# Patient Record
Sex: FEMALE | Race: WHITE | URBAN - METROPOLITAN AREA
[De-identification: names, ages, dates, MRNs, and addresses within clinical notes are randomized per-mention and may not be internally consistent; named-entity substitution may affect disease eponyms.]

---

## 2017-05-16 ENCOUNTER — OUTPATIENT (OUTPATIENT)
Dept: OUTPATIENT SERVICES | Facility: HOSPITAL | Age: 47
LOS: 1 days | Discharge: HOME | End: 2017-05-16

## 2017-06-28 DIAGNOSIS — Z00.00 ENCOUNTER FOR GENERAL ADULT MEDICAL EXAMINATION WITHOUT ABNORMAL FINDINGS: ICD-10-CM

## 2017-06-28 DIAGNOSIS — N39.0 URINARY TRACT INFECTION, SITE NOT SPECIFIED: ICD-10-CM

## 2018-08-27 ENCOUNTER — OUTPATIENT (OUTPATIENT)
Dept: OUTPATIENT SERVICES | Facility: HOSPITAL | Age: 48
LOS: 1 days | Discharge: HOME | End: 2018-08-27

## 2018-08-27 DIAGNOSIS — F33.1 MAJOR DEPRESSIVE DISORDER, RECURRENT, MODERATE: ICD-10-CM

## 2018-08-27 DIAGNOSIS — F60.3 BORDERLINE PERSONALITY DISORDER: ICD-10-CM

## 2018-08-27 DIAGNOSIS — F41.1 GENERALIZED ANXIETY DISORDER: ICD-10-CM

## 2018-10-08 PROBLEM — Z00.00 ENCOUNTER FOR PREVENTIVE HEALTH EXAMINATION: Status: ACTIVE | Noted: 2018-10-08

## 2018-10-29 ENCOUNTER — APPOINTMENT (OUTPATIENT)
Dept: OTOLARYNGOLOGY | Facility: CLINIC | Age: 48
End: 2018-10-29
Payer: MEDICARE

## 2018-10-29 DIAGNOSIS — R42 DIZZINESS AND GIDDINESS: ICD-10-CM

## 2018-10-29 PROCEDURE — 92557 COMPREHENSIVE HEARING TEST: CPT

## 2018-10-29 PROCEDURE — 92570 ACOUSTIC IMMITANCE TESTING: CPT

## 2018-10-29 PROCEDURE — 99203 OFFICE O/P NEW LOW 30 MIN: CPT | Mod: 25

## 2019-10-24 ENCOUNTER — TRANSCRIPTION ENCOUNTER (OUTPATIENT)
Age: 49
End: 2019-10-24

## 2022-11-16 ENCOUNTER — APPOINTMENT (OUTPATIENT)
Dept: ORTHOPEDIC SURGERY | Facility: CLINIC | Age: 52
End: 2022-11-16

## 2022-11-16 PROCEDURE — 73130 X-RAY EXAM OF HAND: CPT | Mod: RT

## 2022-11-16 PROCEDURE — 99204 OFFICE O/P NEW MOD 45 MIN: CPT

## 2022-11-16 PROCEDURE — 99214 OFFICE O/P EST MOD 30 MIN: CPT
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
## 2022-12-14 ENCOUNTER — APPOINTMENT (OUTPATIENT)
Dept: ORTHOPEDIC SURGERY | Facility: CLINIC | Age: 52
End: 2022-12-14

## 2022-12-14 PROCEDURE — 99213 OFFICE O/P EST LOW 20 MIN: CPT

## 2022-12-14 NOTE — ASSESSMENT
[FreeTextEntry1] : Patient comes in for follow-up of her right thumb as well as small finger.  She is in therapy.  She says she is doing a lot better.  She still has some swelling.  She has been using the thumb spica brace.\par \par   Right hand : PIP joint swelling small finger, nontender palpation, decreased extension of the finger 10° to 100°,   Able to touch tip of finger to palm slightly decreased range of motion at DIP joint in flexion, PIP joint in extension, nontender palpation over UCL of thumb MP joint, no laxity with stressing in both extension and 30° of flexion, full range of motion of thumb, neurovascularly intact\par \par  the patient is doing a lot better than prior.  She is going to wean herself off of thumb spica brace and will use a thumb spica brace when she is doing anything that is strenuous.  Otherwise she will not use a thumb spica brace.  She will continue with therapy for range of motion of the small finger.  I will see her in 6 weeks.  I did discuss with her the swelling may never 100% improve.  I also discussed with her she may not get full range of motion back of the small finger with a mild flexion contracture.

## 2023-01-25 ENCOUNTER — APPOINTMENT (OUTPATIENT)
Dept: ORTHOPEDIC SURGERY | Facility: CLINIC | Age: 53
End: 2023-01-25
Payer: MEDICARE

## 2023-01-25 DIAGNOSIS — S63.636A SPRAIN OF INTERPHALANGEAL JOINT OF RIGHT LITTLE FINGER, INITIAL ENCOUNTER: ICD-10-CM

## 2023-01-25 DIAGNOSIS — S63.641A SPRAIN OF METACARPOPHALANGEAL JOINT OF RIGHT THUMB, INITIAL ENCOUNTER: ICD-10-CM

## 2023-01-25 PROCEDURE — 99213 OFFICE O/P EST LOW 20 MIN: CPT

## 2023-01-25 NOTE — ASSESSMENT
[FreeTextEntry1] : Patient comes in for follow-up of her right thumb as well as small finger.  She is in therapy. She has been doing therapy at home as well.  She says she is doing a lot better. She says that she feels wobbly in the thumb.  She shakes a little bit.  She only had 6 sessions of therapy.\par \par   Right hand : PIP joint swelling small finger, nontender palpation, decreased extension of the finger 20° to 110°,     nontender palpation over UCL of thumb MP joint, no laxity with stressing in both extension and 30° of flexion, full range of motion of thumb, neurovascularly intact\par \par  the patient is doing a lot better than prior.    I do believe she would do better with therapy.  I am recommending an LMB splint for the small finger in addition to more therapy for strengthening of the hand to help with the thumb.  She will follow up in about 6 weeks or so if she finds she is not improving.

## 2023-02-08 ENCOUNTER — EMERGENCY (EMERGENCY)
Facility: HOSPITAL | Age: 53
LOS: 0 days | Discharge: HOME | End: 2023-02-08
Attending: EMERGENCY MEDICINE
Payer: MEDICARE

## 2023-02-08 VITALS
RESPIRATION RATE: 18 BRPM | HEART RATE: 104 BPM | OXYGEN SATURATION: 95 % | SYSTOLIC BLOOD PRESSURE: 128 MMHG | DIASTOLIC BLOOD PRESSURE: 82 MMHG | TEMPERATURE: 98 F

## 2023-02-08 VITALS
DIASTOLIC BLOOD PRESSURE: 63 MMHG | SYSTOLIC BLOOD PRESSURE: 102 MMHG | RESPIRATION RATE: 18 BRPM | OXYGEN SATURATION: 97 % | HEART RATE: 68 BPM

## 2023-02-08 DIAGNOSIS — R42 DIZZINESS AND GIDDINESS: ICD-10-CM

## 2023-02-08 DIAGNOSIS — F41.9 ANXIETY DISORDER, UNSPECIFIED: ICD-10-CM

## 2023-02-08 DIAGNOSIS — R11.10 VOMITING, UNSPECIFIED: ICD-10-CM

## 2023-02-08 DIAGNOSIS — E03.9 HYPOTHYROIDISM, UNSPECIFIED: ICD-10-CM

## 2023-02-08 DIAGNOSIS — F90.9 ATTENTION-DEFICIT HYPERACTIVITY DISORDER, UNSPECIFIED TYPE: ICD-10-CM

## 2023-02-08 DIAGNOSIS — T43.221A POISONING BY SELECTIVE SEROTONIN REUPTAKE INHIBITORS, ACCIDENTAL (UNINTENTIONAL), INITIAL ENCOUNTER: ICD-10-CM

## 2023-02-08 DIAGNOSIS — F32.A DEPRESSION, UNSPECIFIED: ICD-10-CM

## 2023-02-08 DIAGNOSIS — R63.0 ANOREXIA: ICD-10-CM

## 2023-02-08 DIAGNOSIS — Z71.1 PERSON WITH FEARED HEALTH COMPLAINT IN WHOM NO DIAGNOSIS IS MADE: ICD-10-CM

## 2023-02-08 LAB
ALBUMIN SERPL ELPH-MCNC: 4.5 G/DL — SIGNIFICANT CHANGE UP (ref 3.5–5.2)
ALP SERPL-CCNC: 90 U/L — SIGNIFICANT CHANGE UP (ref 30–115)
ALT FLD-CCNC: 12 U/L — SIGNIFICANT CHANGE UP (ref 0–41)
ANION GAP SERPL CALC-SCNC: 12 MMOL/L — SIGNIFICANT CHANGE UP (ref 7–14)
AST SERPL-CCNC: 15 U/L — SIGNIFICANT CHANGE UP (ref 0–41)
BASOPHILS # BLD AUTO: 0.05 K/UL — SIGNIFICANT CHANGE UP (ref 0–0.2)
BASOPHILS NFR BLD AUTO: 0.8 % — SIGNIFICANT CHANGE UP (ref 0–1)
BILIRUB SERPL-MCNC: 0.2 MG/DL — SIGNIFICANT CHANGE UP (ref 0.2–1.2)
BUN SERPL-MCNC: 13 MG/DL — SIGNIFICANT CHANGE UP (ref 10–20)
CALCIUM SERPL-MCNC: 10.6 MG/DL — HIGH (ref 8.4–10.5)
CHLORIDE SERPL-SCNC: 102 MMOL/L — SIGNIFICANT CHANGE UP (ref 98–110)
CO2 SERPL-SCNC: 24 MMOL/L — SIGNIFICANT CHANGE UP (ref 17–32)
CREAT SERPL-MCNC: 1.2 MG/DL — SIGNIFICANT CHANGE UP (ref 0.7–1.5)
EGFR: 54 ML/MIN/1.73M2 — LOW
EOSINOPHIL # BLD AUTO: 0.11 K/UL — SIGNIFICANT CHANGE UP (ref 0–0.7)
EOSINOPHIL NFR BLD AUTO: 1.7 % — SIGNIFICANT CHANGE UP (ref 0–8)
GLUCOSE SERPL-MCNC: 87 MG/DL — SIGNIFICANT CHANGE UP (ref 70–99)
HCG SERPL QL: NEGATIVE — SIGNIFICANT CHANGE UP
HCT VFR BLD CALC: 35.6 % — LOW (ref 37–47)
HGB BLD-MCNC: 11.5 G/DL — LOW (ref 12–16)
IMM GRANULOCYTES NFR BLD AUTO: 0.2 % — SIGNIFICANT CHANGE UP (ref 0.1–0.3)
LIDOCAIN IGE QN: 18 U/L — SIGNIFICANT CHANGE UP (ref 7–60)
LYMPHOCYTES # BLD AUTO: 1.53 K/UL — SIGNIFICANT CHANGE UP (ref 1.2–3.4)
LYMPHOCYTES # BLD AUTO: 23.9 % — SIGNIFICANT CHANGE UP (ref 20.5–51.1)
MAGNESIUM SERPL-MCNC: 1.8 MG/DL — SIGNIFICANT CHANGE UP (ref 1.8–2.4)
MCHC RBC-ENTMCNC: 29 PG — SIGNIFICANT CHANGE UP (ref 27–31)
MCHC RBC-ENTMCNC: 32.3 G/DL — SIGNIFICANT CHANGE UP (ref 32–37)
MCV RBC AUTO: 89.9 FL — SIGNIFICANT CHANGE UP (ref 81–99)
MONOCYTES # BLD AUTO: 0.73 K/UL — HIGH (ref 0.1–0.6)
MONOCYTES NFR BLD AUTO: 11.4 % — HIGH (ref 1.7–9.3)
NEUTROPHILS # BLD AUTO: 3.98 K/UL — SIGNIFICANT CHANGE UP (ref 1.4–6.5)
NEUTROPHILS NFR BLD AUTO: 62 % — SIGNIFICANT CHANGE UP (ref 42.2–75.2)
NRBC # BLD: 0 /100 WBCS — SIGNIFICANT CHANGE UP (ref 0–0)
PLATELET # BLD AUTO: 254 K/UL — SIGNIFICANT CHANGE UP (ref 130–400)
POTASSIUM SERPL-MCNC: 3.5 MMOL/L — SIGNIFICANT CHANGE UP (ref 3.5–5)
POTASSIUM SERPL-SCNC: 3.5 MMOL/L — SIGNIFICANT CHANGE UP (ref 3.5–5)
PROT SERPL-MCNC: 6.8 G/DL — SIGNIFICANT CHANGE UP (ref 6–8)
RBC # BLD: 3.96 M/UL — LOW (ref 4.2–5.4)
RBC # FLD: 12.1 % — SIGNIFICANT CHANGE UP (ref 11.5–14.5)
SODIUM SERPL-SCNC: 138 MMOL/L — SIGNIFICANT CHANGE UP (ref 135–146)
T3 SERPL-MCNC: 125 NG/DL — SIGNIFICANT CHANGE UP (ref 80–200)
T4 AB SER-ACNC: 10.8 UG/DL — SIGNIFICANT CHANGE UP (ref 4.6–12)
TROPONIN T SERPL-MCNC: <0.01 NG/ML — SIGNIFICANT CHANGE UP
TSH SERPL-MCNC: 1.67 UIU/ML — SIGNIFICANT CHANGE UP (ref 0.27–4.2)
WBC # BLD: 6.41 K/UL — SIGNIFICANT CHANGE UP (ref 4.8–10.8)
WBC # FLD AUTO: 6.41 K/UL — SIGNIFICANT CHANGE UP (ref 4.8–10.8)

## 2023-02-08 PROCEDURE — 84480 ASSAY TRIIODOTHYRONINE (T3): CPT

## 2023-02-08 PROCEDURE — 93010 ELECTROCARDIOGRAM REPORT: CPT

## 2023-02-08 PROCEDURE — 84703 CHORIONIC GONADOTROPIN ASSAY: CPT

## 2023-02-08 PROCEDURE — 85025 COMPLETE CBC W/AUTO DIFF WBC: CPT

## 2023-02-08 PROCEDURE — 84484 ASSAY OF TROPONIN QUANT: CPT

## 2023-02-08 PROCEDURE — 36415 COLL VENOUS BLD VENIPUNCTURE: CPT

## 2023-02-08 PROCEDURE — 99451 NTRPROF PH1/NTRNET/EHR 5/>: CPT

## 2023-02-08 PROCEDURE — 80053 COMPREHEN METABOLIC PANEL: CPT

## 2023-02-08 PROCEDURE — 99285 EMERGENCY DEPT VISIT HI MDM: CPT

## 2023-02-08 PROCEDURE — 84443 ASSAY THYROID STIM HORMONE: CPT

## 2023-02-08 PROCEDURE — 83735 ASSAY OF MAGNESIUM: CPT

## 2023-02-08 PROCEDURE — 93010 ELECTROCARDIOGRAM REPORT: CPT | Mod: 77

## 2023-02-08 PROCEDURE — 83690 ASSAY OF LIPASE: CPT

## 2023-02-08 PROCEDURE — 84436 ASSAY OF TOTAL THYROXINE: CPT

## 2023-02-08 PROCEDURE — 93005 ELECTROCARDIOGRAM TRACING: CPT

## 2023-02-08 PROCEDURE — 99284 EMERGENCY DEPT VISIT MOD MDM: CPT

## 2023-02-08 RX ORDER — SODIUM CHLORIDE 9 MG/ML
1000 INJECTION, SOLUTION INTRAVENOUS ONCE
Refills: 0 | Status: COMPLETED | OUTPATIENT
Start: 2023-02-08 | End: 2023-02-08

## 2023-02-08 RX ORDER — CYPROHEPTADINE HYDROCHLORIDE 4 MG/1
4 TABLET ORAL ONCE
Refills: 0 | Status: COMPLETED | OUTPATIENT
Start: 2023-02-08 | End: 2023-02-08

## 2023-02-08 RX ADMIN — SODIUM CHLORIDE 1000 MILLILITER(S): 9 INJECTION, SOLUTION INTRAVENOUS at 18:25

## 2023-02-08 NOTE — ED PROVIDER NOTE - NS ED ATTENDING STATEMENT MOD
This was a shared visit with the [facility]. I reviewed and verified the documentation and independently performed the documented:

## 2023-02-08 NOTE — ED ADULT TRIAGE NOTE - CHIEF COMPLAINT QUOTE
pt c/o sweating, tremors, palpitations. Luvox dose was increased last week. sent to r/o serontinin syndrome. on Wellbutrin as well

## 2023-02-08 NOTE — ED PROVIDER NOTE - PATIENT PORTAL LINK FT
You can access the FollowMyHealth Patient Portal offered by [facility] by registering at the following website: [email or web address]. By joining [email or web address]’s FollowMyHealth portal, you will also be able to view your health information using other applications (apps) compatible with our system.

## 2023-02-08 NOTE — ED ADULT NURSE NOTE - OBJECTIVE STATEMENT
52 yr F c/o sweating, tremors, palpitations. Luvox dose was increased last week. sent to r/o serotonin syndrome. on Wellbutrin as well

## 2023-02-08 NOTE — ED PROVIDER NOTE - ATTENDING APP SHARED VISIT CONTRIBUTION OF CARE
52-year-old female past medical history of anorexia, major depression presenting for evaluation of tremors.  Patient states that she is on fluvoxamine, the dose was increased about 2 weeks ago from 200 mg to 250 mg.  Since then she has been experiencing tremors, intermittent dizziness, sometimes feeling hot.  She also reported about 2 episodes of loose bowel movements every 3 days, last week had 1 day NBNB of emesis.  She contacted her therapist and her PMD who advised her to come to the emergency room to rule out serotonin syndrome. Patient denies any chest pain or shortness of breath, no abdominal or back pain, no urinary complaints, no current diarrhea, no acute focal weakness or paresthesias.  Denies any drug use or tobacco use. Thin middle-age  female resting on the stretcher no acute distress, PERRLA, pink conjunctiva, MMM, supple neck, speaking full sentences, lungs clear to auscultation bilaterally, abdomen soft/NT/ND, lumbar spinal CVA TTP, RRR, well-perfused extremities, no lower leg edema, skin is normal color and temperature, no diaphoresis, mild intermittent tremor noted in bilateral extremities, no hyperreflexia, no nystagmus,, there is barely perceptible clonus of the right foot, patient is awake and alert x3, normal mood and affect, pleasant and cooperative.

## 2023-02-08 NOTE — ED PROVIDER NOTE - PROGRESS NOTE DETAILS
EP: The patient appears very well, management was discussed with toxicology service they recommended 4 mg of cyproheptadine and discharge home.  Patient was also advised to decrease the dose of Luvox to 200 mg. Patient is very happy with the plan, she already has plans on decreasing the medication as discussed with her therapist. pt consented to tox c/s; med not available from rx, d/w tox and rec DC to f/u same rec re: med change as above. pt comfortable with this plan. strict return prec rev'd and all ?s ans

## 2023-02-08 NOTE — ED PROVIDER NOTE - CLINICAL SUMMARY MEDICAL DECISION MAKING FREE TEXT BOX
51-year-old female on SSRI presenting with symptoms of serotonergic excess, but not serotonin syndrome.  There are no mental changes in mental status, no autonomic instability. Labs were ordered and reviewed, patient appears very well;  management was discussed with toxicology service.  The patient already aware of the need to lower the dose of her medications, she already has/make plans with her therapist to do so in a gradual fashion. 51-year-old female on SSRI presenting with symptoms of serotonergic excess, but not serotonin syndrome.  There are no mental changes in mental status, no autonomic instability. Labs were ordered and reviewed, patient appears very well;  management was discussed with toxicology service.  The patient already aware of the need to lower the dose of her medications, she already has/make plans with her therapist to do so in a gradual fashion..

## 2023-02-08 NOTE — ED PROVIDER NOTE - NSFOLLOWUPCLINICS_GEN_ALL_ED_FT
A Family Medicine Doctor  Family Medicine  .  [place]   Phone:   Fax:     A Psychiatrist  Psychiatry  .  [place]   Phone:   Fax:

## 2023-02-08 NOTE — CONSULT NOTE ADULT - SUBJECTIVE AND OBJECTIVE BOX
MEDICAL TOXICOLOGY CONSULT    HPI: Pt is a 51 yo F, PMHx of hypothyroidism, MDD, ADHD, and anxiety, who was sent in by her PMD and psychiatric for conceren for serotonin syndrome. 2 weeks ago, the patient had fluvoxamine increased from 200 mg to 250 mg daily. Then around 10 days ago, the patient started to experience palpitations, tremors, diarrhea, abdominal pain, and one episode of NBNB emesis. The symptoms have been worsening. Other medications include bupropion, levothyroxine, risedronate sodium, clonazepam, trazodone, adderall, vitamin C, and calcium.     PAST MEDICAL & SURGICAL HISTORY: see hpi      MEDICATION HISTORY: see hpi      FAMILY HISTORY: n/a      REVIEW OF SYSTEMS:  All systems negative except per HPI.        Vital Signs Last 24 Hrs  T(C): 36.7 (08 Feb 2023 15:47), Max: 36.7 (08 Feb 2023 15:47)  T(F): 98 (08 Feb 2023 15:47), Max: 98 (08 Feb 2023 15:47)  HR: 68 (08 Feb 2023 23:25) (68 - 104)  BP: 102/63 (08 Feb 2023 23:25) (102/63 - 128/82)  BP(mean): --  RR: 18 (08 Feb 2023 23:25) (18 - 18)  SpO2: 97% (08 Feb 2023 23:25) (95% - 97%)    Parameters below as of 08 Feb 2023 15:47  Patient On (Oxygen Delivery Method): room air        SIGNIFICANT LABORATORY STUDIES:                        11.5   6.41  )-----------( 254      ( 08 Feb 2023 18:38 )             35.6       02-08    138  |  102  |  13  ----------------------------<  87  3.5   |  24  |  1.2    Ca    10.6<H>      08 Feb 2023 18:38  Mg     1.8     02-08    TPro  6.8  /  Alb  4.5  /  TBili  0.2  /  DBili  x   /  AST  15  /  ALT  12  /  AlkPhos  90  02-08              Anion Gap: 12 02-08 @ 18:38  CK: -- 02-08 @ 18:38  Troponin:  --  02-08 @ 18:38  Pro-BNP:  --  02-08 @ 18:38  VBG:  --  02-08 @ 18:38  Carboxyhemoglobin %:  --  02-08 @ 18:38  Methemoglobin %:  --  02-08 @ 18:38  Osmolality Serum:  --  02-08 @ 18:38  Aspirin Level: --  02-08 @ 18:38  Acetaminophen Level:  --  02-08 @ 18:38  Ethanol Level:  --  02-08 @ 18:38  Digoxin Level:  --  02-08 @ 18:38  Phenytoin Level:  --  02-08 @ 18:38  Carbamazepine level:  --  02-08 @ 18:38  Lamotrigine level:  --  02-08 @ 18:38

## 2023-02-08 NOTE — CONSULT NOTE ADULT - ASSESSMENT
51 yo F, PMHx of hypothyroidism, MDD, ADHD, and anxiety, who was sent in by her PMD and psychiatric for concern for serotonin syndrome after increased fluvoxamine dose 2 weeks ago. She describes palpitations, tremors, diarrhea, abdominal pain, and one episode of NBNB emesis. Other medications include bupropion, levothyroxine, risedronate sodium, clonazepam, trazodone, adderall, vitamin C, and calcium. She has mild tachycardia with otherwise normal vitals. Examination show anxiety and bilateral hand tremors. No clonus, nystagmus, hyperreflexia, rigidity, or diaphoresis. EKG unremarkable.    "The patient's physical examination is not consistent with serotonin syndrome. However, given her history of increase SSRI dosage and describe symptoms, she may have mild serotonergic excess causing her symptoms that will not cause significant morbidity or mortality.     #Recommendation  - Recommend holding next dose of fluvoxamine, then restart at previous dose of 200 mg per dose. Continue other home medications as directed.   - Follow up with PMD and psychiatrics for long term medication management.    Thank you for involving us in the care of this patient. Assessment and plan discussed with toxicology attending Dr. Colón. Please do not hesitate to reach out to the toxicology team for any further questions or concerns.    The On-Call Toxicology Fellow can be reached 24/7 via Pager #[phone]  Please send a 10 digit call back # as [place] cover multiple hospitals    Markus Coppola MD  Toxicology Fellow  PGY-4   51 yo F, PMHx of hypothyroidism, MDD, ADHD, and anxiety, who was sent in by her PMD and psychiatric for concern for serotonin syndrome after increased fluvoxamine dose 2 weeks ago. She describes palpitations, tremors, diarrhea, abdominal pain, and one episode of NBNB emesis. Other medications include bupropion, levothyroxine, risedronate sodium, clonazepam, trazodone, adderall, vitamin C, and calcium. She has mild tachycardia with otherwise normal vitals. Examination show anxiety and bilateral hand tremors. No clonus, nystagmus, hyperreflexia, rigidity, or diaphoresis. EKG unremarkable.    "The patient's physical examination is not consistent with serotonin syndrome. However, given her history of increase SSRI dosage and describe symptoms, she may have mild serotonergic excess causing her symptoms that will not cause significant morbidity or mortality.     #Recommendation  - Recommend holding next dose of fluvoxamine, then restart at previous dose of 200 mg per dose. Continue other home medications as directed.   - Follow up with PMD and psychiatrics for long term medication management.    Thank you for involving us in the care of this patient. Assessment and plan discussed with toxicology attending Dr. Colón. Please do not hesitate to reach out to the toxicology team for any further questions or concerns.    The On-Call Toxicology Fellow can be reached 24/7 via Pager #[phone]  Please send a 10 digit call back # as [place] cover multiple hospitals    Markus Coppola MD  Toxicology Fellow  PGY-4    I personally discussed with ED team. I reviewed the med tox fellow’s note (as assigned above), and agree with the findings and plan except as documented in my note.    Unlikely serotonin syndrome.  Possibly mild serotonin toxicity.  Will hold dose for a day and go back to original dosing.  Any new or worsening encouraged to return to ED.    --Please call with any further questions    Katarzyna    [phone] (pager)

## 2023-02-08 NOTE — ED PROVIDER NOTE - OBJECTIVE STATEMENT
pt with pmhx anorexia, major depression, ADHD sent to ED by psych and PMD for eval of tremors, ?related to increasing dose of luvox from 200mg to 250mg 2 weeks ago. Denies fever/chill/HA/dizziness/chest pain/sob/abd pain/black stool/bloody stool/urinary sxs

## 2023-02-08 NOTE — ED PROVIDER NOTE - PHYSICAL EXAMINATION
CONSTITUTIONAL: Well-appearing; well-nourished; in no apparent distress.   NECK: Supple; non-tender; no cervical lymphadenopathy.   CARDIOVASCULAR: Normal S1, S2; no murmurs, rubs, or gallops.   RESPIRATORY: Normal chest excursion with respiration; breath sounds clear and equal bilaterally; no wheezes, rhonchi, or rales.  GI/: Non-distended; non-tender; no palpable organomegaly.   MS: No evidence of trauma or deformity. Normal ROM in all four extremities; non-tender to palpation; distal pulses are normal.   SKIN: Normal for age and race; warm; dry; good turgor; no apparent lesions or exudate.   NEURO/PSYCH: +tremors, A & O x 4; grossly unremarkable. mood and manner are appropriate.

## 2024-10-23 ENCOUNTER — APPOINTMENT (OUTPATIENT)
Dept: PSYCHIATRY | Facility: CLINIC | Age: 54
End: 2024-10-23